# Patient Record
Sex: FEMALE | Race: ASIAN | NOT HISPANIC OR LATINO
[De-identification: names, ages, dates, MRNs, and addresses within clinical notes are randomized per-mention and may not be internally consistent; named-entity substitution may affect disease eponyms.]

---

## 2018-02-06 PROBLEM — Z00.00 ENCOUNTER FOR PREVENTIVE HEALTH EXAMINATION: Status: ACTIVE | Noted: 2018-02-06

## 2020-07-21 ENCOUNTER — TRANSCRIPTION ENCOUNTER (OUTPATIENT)
Age: 40
End: 2020-07-21

## 2020-07-28 ENCOUNTER — TRANSCRIPTION ENCOUNTER (OUTPATIENT)
Age: 40
End: 2020-07-28

## 2021-05-08 ENCOUNTER — EMERGENCY (EMERGENCY)
Facility: HOSPITAL | Age: 41
LOS: 0 days | Discharge: HOME | End: 2021-05-08
Attending: EMERGENCY MEDICINE
Payer: COMMERCIAL

## 2021-05-08 VITALS
RESPIRATION RATE: 16 BRPM | OXYGEN SATURATION: 98 % | HEART RATE: 86 BPM | SYSTOLIC BLOOD PRESSURE: 114 MMHG | WEIGHT: 210.1 LBS | DIASTOLIC BLOOD PRESSURE: 53 MMHG | TEMPERATURE: 99 F | HEIGHT: 62 IN

## 2021-05-08 VITALS
DIASTOLIC BLOOD PRESSURE: 85 MMHG | OXYGEN SATURATION: 99 % | TEMPERATURE: 98 F | RESPIRATION RATE: 17 BRPM | HEART RATE: 86 BPM | SYSTOLIC BLOOD PRESSURE: 134 MMHG

## 2021-05-08 DIAGNOSIS — R06.02 SHORTNESS OF BREATH: ICD-10-CM

## 2021-05-08 DIAGNOSIS — R07.9 CHEST PAIN, UNSPECIFIED: ICD-10-CM

## 2021-05-08 LAB
ALBUMIN SERPL ELPH-MCNC: 3.7 G/DL — SIGNIFICANT CHANGE UP (ref 3.3–5)
ALP SERPL-CCNC: 99 U/L — SIGNIFICANT CHANGE UP (ref 40–120)
ALT FLD-CCNC: 69 U/L — SIGNIFICANT CHANGE UP (ref 12–78)
ANION GAP SERPL CALC-SCNC: 5 MMOL/L — SIGNIFICANT CHANGE UP (ref 5–17)
AST SERPL-CCNC: 34 U/L — SIGNIFICANT CHANGE UP (ref 15–37)
BASOPHILS # BLD AUTO: 0.05 K/UL — SIGNIFICANT CHANGE UP (ref 0–0.2)
BASOPHILS NFR BLD AUTO: 0.7 % — SIGNIFICANT CHANGE UP (ref 0–2)
BILIRUB SERPL-MCNC: 0.6 MG/DL — SIGNIFICANT CHANGE UP (ref 0.2–1.2)
BUN SERPL-MCNC: 12 MG/DL — SIGNIFICANT CHANGE UP (ref 7–23)
CALCIUM SERPL-MCNC: 8.9 MG/DL — SIGNIFICANT CHANGE UP (ref 8.5–10.1)
CHLORIDE SERPL-SCNC: 104 MMOL/L — SIGNIFICANT CHANGE UP (ref 96–108)
CO2 SERPL-SCNC: 27 MMOL/L — SIGNIFICANT CHANGE UP (ref 22–31)
CREAT SERPL-MCNC: 0.94 MG/DL — SIGNIFICANT CHANGE UP (ref 0.5–1.3)
EOSINOPHIL # BLD AUTO: 0.19 K/UL — SIGNIFICANT CHANGE UP (ref 0–0.5)
EOSINOPHIL NFR BLD AUTO: 2.5 % — SIGNIFICANT CHANGE UP (ref 0–6)
GLUCOSE SERPL-MCNC: 94 MG/DL — SIGNIFICANT CHANGE UP (ref 70–99)
HCG SERPL-ACNC: <1 MIU/ML — SIGNIFICANT CHANGE UP
HCT VFR BLD CALC: 45.4 % — HIGH (ref 34.5–45)
HGB BLD-MCNC: 15.2 G/DL — SIGNIFICANT CHANGE UP (ref 11.5–15.5)
IMM GRANULOCYTES NFR BLD AUTO: 0.3 % — SIGNIFICANT CHANGE UP (ref 0–1.5)
LYMPHOCYTES # BLD AUTO: 2.88 K/UL — SIGNIFICANT CHANGE UP (ref 1–3.3)
LYMPHOCYTES # BLD AUTO: 38.2 % — SIGNIFICANT CHANGE UP (ref 13–44)
MCHC RBC-ENTMCNC: 29.3 PG — SIGNIFICANT CHANGE UP (ref 27–34)
MCHC RBC-ENTMCNC: 33.5 GM/DL — SIGNIFICANT CHANGE UP (ref 32–36)
MCV RBC AUTO: 87.6 FL — SIGNIFICANT CHANGE UP (ref 80–100)
MONOCYTES # BLD AUTO: 0.65 K/UL — SIGNIFICANT CHANGE UP (ref 0–0.9)
MONOCYTES NFR BLD AUTO: 8.6 % — SIGNIFICANT CHANGE UP (ref 2–14)
NEUTROPHILS # BLD AUTO: 3.74 K/UL — SIGNIFICANT CHANGE UP (ref 1.8–7.4)
NEUTROPHILS NFR BLD AUTO: 49.7 % — SIGNIFICANT CHANGE UP (ref 43–77)
NRBC # BLD: 0 /100 WBCS — SIGNIFICANT CHANGE UP (ref 0–0)
NT-PROBNP SERPL-SCNC: 17 PG/ML — SIGNIFICANT CHANGE UP (ref 0–125)
PLATELET # BLD AUTO: 266 K/UL — SIGNIFICANT CHANGE UP (ref 150–400)
POTASSIUM SERPL-MCNC: 4.1 MMOL/L — SIGNIFICANT CHANGE UP (ref 3.5–5.3)
POTASSIUM SERPL-SCNC: 4.1 MMOL/L — SIGNIFICANT CHANGE UP (ref 3.5–5.3)
PROT SERPL-MCNC: 7.5 GM/DL — SIGNIFICANT CHANGE UP (ref 6–8.3)
RBC # BLD: 5.18 M/UL — SIGNIFICANT CHANGE UP (ref 3.8–5.2)
RBC # FLD: 11.9 % — SIGNIFICANT CHANGE UP (ref 10.3–14.5)
SODIUM SERPL-SCNC: 136 MMOL/L — SIGNIFICANT CHANGE UP (ref 135–145)
TROPONIN I SERPL-MCNC: <.015 NG/ML — SIGNIFICANT CHANGE UP (ref 0.01–0.04)
WBC # BLD: 7.53 K/UL — SIGNIFICANT CHANGE UP (ref 3.8–10.5)
WBC # FLD AUTO: 7.53 K/UL — SIGNIFICANT CHANGE UP (ref 3.8–10.5)

## 2021-05-08 PROCEDURE — 99285 EMERGENCY DEPT VISIT HI MDM: CPT

## 2021-05-08 PROCEDURE — 71045 X-RAY EXAM CHEST 1 VIEW: CPT | Mod: 26

## 2021-05-08 PROCEDURE — 93010 ELECTROCARDIOGRAM REPORT: CPT

## 2021-05-08 RX ORDER — ALBUTEROL 90 UG/1
3 AEROSOL, METERED ORAL
Qty: 0 | Refills: 0 | DISCHARGE

## 2021-05-08 NOTE — ED PROVIDER NOTE - PMH
TBD; PT will follow No pertinent past medical history <<----- Click to add NO pertinent Past Medical History

## 2021-05-08 NOTE — ED PROVIDER NOTE - CLINICAL SUMMARY MEDICAL DECISION MAKING FREE TEXT BOX
DDx: R/o ACS although unlike no pleuritic pain to suggest PE  Plan: CBC, CMP, troponin, D-dimer, chest X-ray, EKG, reassess DDx: R/o ACS although unlikey, no pleuritic pain to suggest PE  Plan: CBC, CMP, troponin, chest X-ray, EKG, reassess

## 2021-05-08 NOTE — ED PROVIDER NOTE - OBJECTIVE STATEMENT
Pt is a 40 year old female w/PMH of asthma who presents to the ED today for CP and SOB that started Thursday. Describes CP as tight and rates it a 2/10. Pain worsened today. Denies taking OCP. Pt has family hx of cardiac diseases. No hx of DVT/PE, No pleuritic pain, no CP currently.

## 2021-05-08 NOTE — ED ADULT NURSE NOTE - OBJECTIVE STATEMENT
39 Y/O FEMALE with PMH of Asthma. Presents to the ED with c/c of SOB, & middle chest pain from Thursday and has gotten worst today. pt LMP was 3/14/2021 and has not had a period since. pt denies any N/V/D/C fever or chills.

## 2021-05-08 NOTE — ED ADULT NURSE NOTE - NSSUHOSCREENINGYN_ED_ALL_ED
Please send out result letter with the following note, thanks.    Dom,    Your diabetes was test is elevated and shows that you have diabetes.  It is slightly elevated, not to the point where we need to consider medication.  We can discuss in more detail at our follow up visit.    -Teetee Hammer, CNP
Yes - the patient is able to be screened

## 2021-05-08 NOTE — ED PROVIDER NOTE - CARE PROVIDER_API CALL
Pablo Love  CARDIOLOGY  [address] [facility][1], [address] [place] [address]  Phone: [phone]  Fax: [phone]  Follow Up Time: 1-3 Days

## 2021-05-08 NOTE — ED PROVIDER NOTE - PROGRESS NOTE DETAILS
Pt is feeling better, labs wnl, ecg wnl, and cxr negative on my read. Pt is reassured, asymptomatic, and referred to cardiology for f/u. Pt is feeling well and is ready for d/c.

## 2021-05-08 NOTE — ED PROVIDER NOTE - PATIENT PORTAL LINK FT
You can access the FollowMyHealth Patient Portal offered by [facility] by registering at the following website: [email or web address]. By joining [facility]’s FollowMyHealth portal, you will also be able to view your health information using other applications (apps) compatible with our system.

## 2022-05-23 PROBLEM — Z78.9 OTHER SPECIFIED HEALTH STATUS: Chronic | Status: ACTIVE | Noted: 2021-05-08

## 2022-07-10 ENCOUNTER — NON-APPOINTMENT (OUTPATIENT)
Age: 42
End: 2022-07-10

## 2022-07-13 ENCOUNTER — NON-APPOINTMENT (OUTPATIENT)
Age: 42
End: 2022-07-13

## 2022-07-14 ENCOUNTER — APPOINTMENT (OUTPATIENT)
Dept: HUMAN REPRODUCTION | Facility: CLINIC | Age: 42
End: 2022-07-14

## 2022-07-14 PROCEDURE — 36415 COLL VENOUS BLD VENIPUNCTURE: CPT

## 2022-07-14 PROCEDURE — 76830 TRANSVAGINAL US NON-OB: CPT

## 2022-07-14 PROCEDURE — 99205 OFFICE O/P NEW HI 60 MIN: CPT | Mod: 25

## 2022-08-03 ENCOUNTER — APPOINTMENT (OUTPATIENT)
Dept: OBGYN | Facility: CLINIC | Age: 42
End: 2022-08-03

## 2022-08-03 VITALS
HEIGHT: 62 IN | DIASTOLIC BLOOD PRESSURE: 76 MMHG | OXYGEN SATURATION: 98 % | WEIGHT: 215 LBS | SYSTOLIC BLOOD PRESSURE: 118 MMHG | HEART RATE: 70 BPM | BODY MASS INDEX: 39.56 KG/M2

## 2022-08-03 DIAGNOSIS — N83.209 UNSPECIFIED OVARIAN CYST, UNSPECIFIED SIDE: ICD-10-CM

## 2022-08-03 DIAGNOSIS — D25.9 LEIOMYOMA OF UTERUS, UNSPECIFIED: ICD-10-CM

## 2022-08-03 PROCEDURE — 99203 OFFICE O/P NEW LOW 30 MIN: CPT

## 2022-08-03 NOTE — PHYSICAL EXAM
[Appropriately responsive] : appropriately responsive [Alert] : alert [No Acute Distress] : no acute distress [No Lymphadenopathy] : no lymphadenopathy [Soft] : soft [Non-tender] : non-tender [Non-distended] : non-distended [No HSM] : No HSM [No Lesions] : no lesions [No Mass] : no mass [Oriented x3] : oriented x3 [FreeTextEntry7] : abd soft nt, nd

## 2022-08-03 NOTE — END OF VISIT
[FreeTextEntry3] : I, Jyoti Cain, acted as a scribe on behalf of Dr. Natalia Villareal on 08/03/2022 .\par \par All medical entries made by the scribe were at my, Dr. Natalia Villareal, direction and personally dictated by me on 08/03/2022 . I have reviewed the chart and agree that the record accurately reflects my personal performance of the history, physical exam, assessment and plan. I have also personally directed, reviewed, and agreed with the chart.\par

## 2022-08-03 NOTE — HISTORY OF PRESENT ILLNESS
[Patient reported PAP Smear was normal] : Patient reported PAP Smear was normal [FreeTextEntry1] : 41 y/o LMP 6/25/22 female for consultation regarding polyp and fibroids. Pt referred by Dr. Erica William. Pt states past MRI shows 7 fibroids and 1 polyp. Pt states wanting to conceive, has partner. Pt hx of TOP in 2001.  Pt reports regular menses every 28 days, lasting 5-7 days. Heavy menses for first 2 days, no pain. Pt recently noticed light spotting 3-4 days of spotting after menses. Has been happening for 2 years. Pt reports leakage of urine starting recently last month. \par \par GynHx: fibroids, polyp, chlamydia (2005)\par ObHx: Top (2001) \par PmHX: asthma\par PsHx: lipoma in right breast removed (2/2011)\par FamHx: DM (mother), Hypertension (mother, father), hyperlipemia (father) , pancreatic cancer ([facility]), heart attack (PGF), fibroids (mother) \par Current meds :denies, Pt d/c bc in 2011\par allergies: shrimp, sesame seed, animal dander, reaction to anesthesia \par NKDA [Mammogramdate] : 12/23/21 [PapSmeardate] : 10/29/21

## 2022-08-03 NOTE — PLAN
[FreeTextEntry1] : 43 y/o LMP 22 female for consultation regarding polyp and fibroids. \par \par Polyp?/ Ovarian Cyst/fibroids\par -rx pelvic sono to f/u  ovarian cyst on MRI\par - Dr. Villareal will review and f/u\par \par Pt brought MRI Disk to be reviewed\par -Dr. Villareal will upload to hospital and review with patient \par \par Fertility and preconception: \par -briefly discussed >41yo and potential impact on fertility and mentioned ART\par - Discussed the option of continued conservative observation of fibroids vs surgical removal. The risks of surgery including possible infertility and adhesion formation vs the risks of conservative followup: infertility, miscarriage, and PTL were discussed at length.  The possible need for primary  after myomectomy was also outlined.\par \par

## 2022-08-18 ENCOUNTER — APPOINTMENT (OUTPATIENT)
Dept: ULTRASOUND IMAGING | Facility: CLINIC | Age: 42
End: 2022-08-18

## 2022-08-18 ENCOUNTER — OUTPATIENT (OUTPATIENT)
Dept: OUTPATIENT SERVICES | Facility: HOSPITAL | Age: 42
LOS: 1 days | End: 2022-08-18
Payer: COMMERCIAL

## 2022-08-18 DIAGNOSIS — D25.9 LEIOMYOMA OF UTERUS, UNSPECIFIED: ICD-10-CM

## 2022-08-18 DIAGNOSIS — N83.209 UNSPECIFIED OVARIAN CYST, UNSPECIFIED SIDE: ICD-10-CM

## 2022-08-18 PROCEDURE — 76856 US EXAM PELVIC COMPLETE: CPT | Mod: 26

## 2022-08-18 PROCEDURE — 76830 TRANSVAGINAL US NON-OB: CPT | Mod: 26

## 2022-08-18 PROCEDURE — 76830 TRANSVAGINAL US NON-OB: CPT

## 2022-08-18 PROCEDURE — 76856 US EXAM PELVIC COMPLETE: CPT

## 2022-10-28 NOTE — REVIEW OF SYSTEMS
Patient dressed sitting up at bedside awaiting ride.  
Patient's yakov Us called with ETA 14:35. She will call when she arrives.  
[Negative] : Heme/Lymph

## 2022-11-28 ENCOUNTER — EMERGENCY (EMERGENCY)
Facility: HOSPITAL | Age: 42
LOS: 1 days | Discharge: HOME | End: 2022-11-28
Attending: STUDENT IN AN ORGANIZED HEALTH CARE EDUCATION/TRAINING PROGRAM | Admitting: STUDENT IN AN ORGANIZED HEALTH CARE EDUCATION/TRAINING PROGRAM

## 2022-11-28 VITALS
DIASTOLIC BLOOD PRESSURE: 89 MMHG | OXYGEN SATURATION: 99 % | RESPIRATION RATE: 16 BRPM | TEMPERATURE: 99 F | HEART RATE: 97 BPM | SYSTOLIC BLOOD PRESSURE: 146 MMHG

## 2022-11-28 VITALS
SYSTOLIC BLOOD PRESSURE: 152 MMHG | TEMPERATURE: 100 F | DIASTOLIC BLOOD PRESSURE: 91 MMHG | OXYGEN SATURATION: 99 % | RESPIRATION RATE: 17 BRPM | HEART RATE: 99 BPM

## 2022-11-28 LAB
ALBUMIN SERPL ELPH-MCNC: 3.7 G/DL — SIGNIFICANT CHANGE UP (ref 3.3–5)
ALP SERPL-CCNC: 73 U/L — SIGNIFICANT CHANGE UP (ref 40–120)
ALT FLD-CCNC: 34 U/L — HIGH (ref 4–33)
ANION GAP SERPL CALC-SCNC: 12 MMOL/L — SIGNIFICANT CHANGE UP (ref 7–14)
AST SERPL-CCNC: 23 U/L — SIGNIFICANT CHANGE UP (ref 4–32)
BASOPHILS # BLD AUTO: 0.04 K/UL — SIGNIFICANT CHANGE UP (ref 0–0.2)
BASOPHILS NFR BLD AUTO: 0.5 % — SIGNIFICANT CHANGE UP (ref 0–2)
BILIRUB SERPL-MCNC: 0.5 MG/DL — SIGNIFICANT CHANGE UP (ref 0.2–1.2)
BUN SERPL-MCNC: 9 MG/DL — SIGNIFICANT CHANGE UP (ref 7–23)
CALCIUM SERPL-MCNC: 8 MG/DL — LOW (ref 8.4–10.5)
CHLORIDE SERPL-SCNC: 91 MMOL/L — LOW (ref 98–107)
CO2 SERPL-SCNC: 19 MMOL/L — LOW (ref 22–31)
CREAT SERPL-MCNC: 0.79 MG/DL — SIGNIFICANT CHANGE UP (ref 0.5–1.3)
EGFR: 96 ML/MIN/1.73M2 — SIGNIFICANT CHANGE UP
EOSINOPHIL # BLD AUTO: 0.04 K/UL — SIGNIFICANT CHANGE UP (ref 0–0.5)
EOSINOPHIL NFR BLD AUTO: 0.5 % — SIGNIFICANT CHANGE UP (ref 0–6)
GLUCOSE SERPL-MCNC: 92 MG/DL — SIGNIFICANT CHANGE UP (ref 70–99)
HCG SERPL-ACNC: <5 MIU/ML — SIGNIFICANT CHANGE UP
HCT VFR BLD CALC: 43.2 % — SIGNIFICANT CHANGE UP (ref 34.5–45)
HGB BLD-MCNC: 14 G/DL — SIGNIFICANT CHANGE UP (ref 11.5–15.5)
IANC: 6 K/UL — SIGNIFICANT CHANGE UP (ref 1.8–7.4)
IMM GRANULOCYTES NFR BLD AUTO: 0.6 % — SIGNIFICANT CHANGE UP (ref 0–0.9)
LYMPHOCYTES # BLD AUTO: 1.76 K/UL — SIGNIFICANT CHANGE UP (ref 1–3.3)
LYMPHOCYTES # BLD AUTO: 19.9 % — SIGNIFICANT CHANGE UP (ref 13–44)
MCHC RBC-ENTMCNC: 28.7 PG — SIGNIFICANT CHANGE UP (ref 27–34)
MCHC RBC-ENTMCNC: 32.4 GM/DL — SIGNIFICANT CHANGE UP (ref 32–36)
MCV RBC AUTO: 88.7 FL — SIGNIFICANT CHANGE UP (ref 80–100)
MONOCYTES # BLD AUTO: 0.95 K/UL — HIGH (ref 0–0.9)
MONOCYTES NFR BLD AUTO: 10.7 % — SIGNIFICANT CHANGE UP (ref 2–14)
NEUTROPHILS # BLD AUTO: 6 K/UL — SIGNIFICANT CHANGE UP (ref 1.8–7.4)
NEUTROPHILS NFR BLD AUTO: 67.8 % — SIGNIFICANT CHANGE UP (ref 43–77)
NRBC # BLD: 0 /100 WBCS — SIGNIFICANT CHANGE UP (ref 0–0)
NRBC # FLD: 0 K/UL — SIGNIFICANT CHANGE UP (ref 0–0)
PLATELET # BLD AUTO: 173 K/UL — SIGNIFICANT CHANGE UP (ref 150–400)
POTASSIUM SERPL-MCNC: 3.6 MMOL/L — SIGNIFICANT CHANGE UP (ref 3.5–5.3)
POTASSIUM SERPL-SCNC: 3.6 MMOL/L — SIGNIFICANT CHANGE UP (ref 3.5–5.3)
PROT SERPL-MCNC: 6.9 G/DL — SIGNIFICANT CHANGE UP (ref 6–8.3)
RBC # BLD: 4.87 M/UL — SIGNIFICANT CHANGE UP (ref 3.8–5.2)
RBC # FLD: 12.7 % — SIGNIFICANT CHANGE UP (ref 10.3–14.5)
SODIUM SERPL-SCNC: 122 MMOL/L — LOW (ref 135–145)
WBC # BLD: 8.84 K/UL — SIGNIFICANT CHANGE UP (ref 3.8–10.5)
WBC # FLD AUTO: 8.84 K/UL — SIGNIFICANT CHANGE UP (ref 3.8–10.5)

## 2022-11-28 PROCEDURE — 99285 EMERGENCY DEPT VISIT HI MDM: CPT

## 2022-11-28 PROCEDURE — 70487 CT MAXILLOFACIAL W/DYE: CPT | Mod: 26,MA

## 2022-11-28 RX ORDER — KETOROLAC TROMETHAMINE 30 MG/ML
15 SYRINGE (ML) INJECTION ONCE
Refills: 0 | Status: COMPLETED | OUTPATIENT
Start: 2022-11-28 | End: 2022-11-28

## 2022-11-28 RX ORDER — ACETAMINOPHEN 500 MG
650 TABLET ORAL ONCE
Refills: 0 | Status: COMPLETED | OUTPATIENT
Start: 2022-11-28 | End: 2022-11-28

## 2022-11-28 RX ADMIN — Medication 650 MILLIGRAM(S): at 16:35

## 2022-11-28 NOTE — ED PROVIDER NOTE - CLINICAL SUMMARY MEDICAL DECISION MAKING FREE TEXT BOX
Will obtain basic labs, will also obtain CT imaging with IV contrast of the max face to rule out submandibular abscess, rule out sialoadenitis, rule out Kalia's angina.  Will reassess patient after pain control with Toradol.

## 2022-11-28 NOTE — ED PROVIDER NOTE - OBJECTIVE STATEMENT
Patient is a 42-year-old female no past medical history who is presenting to the emergency department with almost a week of right-sided facial pain.  Worse when she chews.  Has had no fevers.  Says it started underneath her right eye but now feels like its underneath her right lower jaw.  Went to a dentist today who said there was nothing wrong with with her teeth.  So she went to the emergency department for second opinion.  Able to tolerate p.o.  No fevers or systemic signs of infection

## 2022-11-28 NOTE — ED ADULT TRIAGE NOTE - CHIEF COMPLAINT QUOTE
Pt arrives ambulatory to triage c/o "facial swelling" since Wednesday. Seen at urgent care, dx'd with sinus infection. Pt is concerned d/t difficulty eating and swelling spreading underneath her chin. Endorses SOB at night. RR even and unlabored. PMH: asthma.

## 2022-11-28 NOTE — ED PROVIDER NOTE - PATIENT PORTAL LINK FT
You can access the FollowMyHealth Patient Portal offered by [facility] by registering at the following website: [email or web address]. By joining [facility]’s FollowMyHealth portal, you will also be able to view your health information using other applications (apps) compatible with our system.

## 2022-11-28 NOTE — ED PROVIDER NOTE - NSFOLLOWUPINSTRUCTIONS_ED_ALL_ED_FT
take acetaminophen 650 mg every 6 hours and ibuprofen 400 mg every 6 hours with food. Both of these medications work differently to treat pain and inflammation. Please note that these medications are both available over the counter at most pharmacies without a doctor's prescription.      Sialoadenitis    WHAT YOU NEED TO KNOW:    Sialoadenitis is an inflammation or infection of one or more of your salivary glands. A small stone can block the salivary gland and cause inflammation. Infection may be caused by a virus or bacteria. You can develop sialoadenitis on one or both sides of your face.    DISCHARGE INSTRUCTIONS:    Return to the emergency department if:    You have trouble breathing or swallowing because of swelling.    Call your doctor if:    You have trouble opening your mouth because of swelling.    Your salivary gland gets more red and hot or drains more pus.    The pain and swelling do not go away within 2 days, or they get worse.    Your mouth is very dry.    You lose movement on one side of your face.    You have questions about your condition or care.  Medicines: You may need one or more of the following:    Antibiotics may be given to treat a bacterial infection.    Acetaminophen decreases pain and fever. It is available without a doctor's order. Ask how much to give your child and how often to give it. Follow directions. Read the labels of all other medicines your child uses to see if they also contain acetaminophen, or ask your child's doctor or pharmacist. Acetaminophen can cause liver damage if not taken correctly.    NSAIDs, such as ibuprofen, help decrease swelling, pain, and fever. This medicine is available with or without a doctor's order. NSAIDs can cause stomach bleeding or kidney problems in certain people. If you take blood thinner medicine, always ask your healthcare provider if NSAIDs are safe for you. Always read the medicine label and follow directions.    Take your medicine as directed. Contact your healthcare provider if you think your medicine is not helping or if you have side effects. Tell your provider if you are allergic to any medicine. Keep a list of the medicines, vitamins, and herbs you take. Include the amounts, and when and why you take them. Bring the list or the pill bottles to follow-up visits. Carry your medicine list with you in case of an emergency.  Manage or prevent sialoadenitis:    Drink liquids as directed. You may need to drink more liquids than usual. Ask how much liquid to drink each day and which liquids are best for you. Good choices of liquids for most people include water, tea, soup, juice, or milk.    Practice good oral care. Brush your teeth 2 times a day, 1 time in the morning and 1 time in the evening. Use a fluoride toothpaste. Floss your teeth 1 time each day, usually in the evening. Use mouthwash after you floss. Swish it around in your mouth for 30 seconds and spit it out.    Keep your mouth moist. Suck on hard candy or chew sugarless gum to get your saliva flowing. Sour and tart flavors such as lemon and orange will help get saliva to flow. This will help keep your mouth moist and help push out a stone blocking your salivary duct.    Apply a warm, wet cloth and massage the swollen area as directed. This may help relieve swelling and pain by pushing the pus out of the gland.  Follow up with your doctor or dentist as directed: Write down your questions so you remember to ask them during your visits.

## 2022-11-28 NOTE — ED PROVIDER NOTE - WET READ LAUNCH FT
There are no Wet Read(s) to document. No casts/No immobilization/No edema/Full range of motion with no contractures/No clubbing/No erythema/No splints/No cyanosis/No tenderness

## 2022-11-28 NOTE — ED ADULT NURSE NOTE - OBJECTIVE STATEMENT
Patient is a&ox4 ambulatory at baseline. Patient complaining of facial swelling. Patient denies chest pain sob, patient able to swallow PO meds. No nuerological defecits.

## 2022-11-28 NOTE — ED PROVIDER NOTE - PHYSICAL EXAMINATION
CONSTITUTIONAL: Non-toxic, non-diaphoretic, in no apparent distress  HEAD: Normocephalic; atraumatic  EYES: EOM intact   ENMT: External appears normal;  Oropharynx with multiple filled caries, no acute infection appreciated, no induration no asymmetry of the internal oropharynx.  However patient does have some tenderness to palpation of the right submandibular space, no overlying cellulitis.  No induration.  NECK: grossly normal active ROM,  CARD: No cyanosis, good peripheral perfusion, RRR  RESP: Normal chest excursion with respiration; no increased work of breathing  ABD: non-distended   EXT: moving all extremities, no gross disfigurement or asymmetry,  SKIN: Warm, dry, no rash  NEURO:  moving all extremities, no facial droop, no dysarthria      cn2-12 intact  5/5 all extremities

## 2023-03-28 ENCOUNTER — APPOINTMENT (OUTPATIENT)
Dept: CARDIOLOGY | Facility: CLINIC | Age: 43
End: 2023-03-28

## 2023-04-27 NOTE — ED ADULT TRIAGE NOTE - NSTRIAGECARE_GEN_A_ER
Please notify patient urinalysis shows blood minimal, RBC 5-10, this could be kidney stones or could be cystitis [inflammation of the bladder] ; waiting for urine culture results doubt urine infection.  I do recommend patient sees urology for further work-up; may need even a cystoscopy for further evaluation.  I did place referral to urology already.  Dr Velasco    *Please see other result notes Face Mask

## 2023-06-14 ENCOUNTER — APPOINTMENT (OUTPATIENT)
Dept: OBGYN | Facility: CLINIC | Age: 43
End: 2023-06-14
Payer: COMMERCIAL

## 2023-06-14 PROCEDURE — 99213 OFFICE O/P EST LOW 20 MIN: CPT | Mod: 95

## 2023-11-08 ENCOUNTER — TRANSCRIPTION ENCOUNTER (OUTPATIENT)
Age: 43
End: 2023-11-08

## 2024-05-06 ENCOUNTER — NON-APPOINTMENT (OUTPATIENT)
Age: 44
End: 2024-05-06

## 2024-05-06 ENCOUNTER — APPOINTMENT (OUTPATIENT)
Dept: ORTHOPEDIC SURGERY | Facility: CLINIC | Age: 44
End: 2024-05-06
Payer: COMMERCIAL

## 2024-05-06 VITALS — HEIGHT: 62 IN | BODY MASS INDEX: 42.33 KG/M2 | WEIGHT: 230 LBS

## 2024-05-06 DIAGNOSIS — M23.91 UNSPECIFIED INTERNAL DERANGEMENT OF RIGHT KNEE: ICD-10-CM

## 2024-05-06 DIAGNOSIS — M79.18 MYALGIA, OTHER SITE: ICD-10-CM

## 2024-05-06 DIAGNOSIS — E78.00 PURE HYPERCHOLESTEROLEMIA, UNSPECIFIED: ICD-10-CM

## 2024-05-06 DIAGNOSIS — M23.92 UNSPECIFIED INTERNAL DERANGEMENT OF LEFT KNEE: ICD-10-CM

## 2024-05-06 DIAGNOSIS — I10 ESSENTIAL (PRIMARY) HYPERTENSION: ICD-10-CM

## 2024-05-06 PROCEDURE — 73564 X-RAY EXAM KNEE 4 OR MORE: CPT | Mod: RT

## 2024-05-06 PROCEDURE — 99204 OFFICE O/P NEW MOD 45 MIN: CPT | Mod: 25

## 2024-05-06 RX ORDER — NAPROXEN 500 MG/1
500 TABLET ORAL TWICE DAILY
Qty: 60 | Refills: 0 | Status: ACTIVE | COMMUNITY
Start: 2024-05-06 | End: 1900-01-01

## 2024-05-06 NOTE — HISTORY OF PRESENT ILLNESS
[de-identified] : 43 year old female  (  )  right knee pain since 5/4/24 when twisted in an escape  room while lifting up leg and twisted and knee gave out and heard a pop  The pain is located   anterior, lateral  The pain is associated with swelling, clicking, buckling  Worse with activity and better at rest. Has tried ice, ibuprofen

## 2024-05-06 NOTE — IMAGING
[de-identified] :  RIGHT KNEE Inspection:  mild effusion Palpation: lateral joint line tenderness  Knee Range of Motion:  0-130  Strength: 5/5 Quadriceps strength, 5/5 Hamstring strength Neurological: light touch is intact throughout Ligament Stability and Special Tests:  McMurrays: Positive Lachman: neg Pivot Shift: neg Posterior Drawer: neg Valgus: neg Varus: neg Patella Apprehension: neg Patella Maltracking: neg

## 2024-05-06 NOTE — ASSESSMENT
[FreeTextEntry1] : Right  X-Ray Examination of the KNEE (4 views): there are no fractures, subluxations or dislocations.  Due to the patients mechanical symptoms along with lateral joint line pain, effusion, and pos aliya test on exam we will get an mri to eval for lateral meniscus tear  - The patient was advised of the diagnosis.  The natural history of the pathology was explained to the patient in layman's terms.  Several different treatment options were discussed and explained including the risks and benefits of both surgical and non-surgical treatments. - The patient was advised to apply ice (wrapped in a towel or protective covering) to the area daily (20 minutes at a time, 2-4X/day). - naprosyn rx - Patient was given a prescription for an anti-inflammatory medication.  They will take it for the next week and then on an as needed basis, as long as there are no medical contra-indications.  Patient is counseled on possible GI, renal, and cardiovascular side effects. - Hinged Knee brace in order to minimize buckling and instability - The patient was advised to modify their activities. - f/u after mri

## 2024-05-09 ENCOUNTER — APPOINTMENT (OUTPATIENT)
Dept: MRI IMAGING | Facility: CLINIC | Age: 44
End: 2024-05-09
Payer: COMMERCIAL

## 2024-05-09 ENCOUNTER — APPOINTMENT (OUTPATIENT)
Dept: MRI IMAGING | Facility: CLINIC | Age: 44
End: 2024-05-09

## 2024-05-09 PROCEDURE — 73721 MRI JNT OF LWR EXTRE W/O DYE: CPT | Mod: RT

## 2024-05-22 PROBLEM — S83.511A RUPTURE OF ANTERIOR CRUCIATE LIGAMENT OF RIGHT KNEE, INITIAL ENCOUNTER: Status: ACTIVE | Noted: 2024-05-22

## 2024-05-22 PROBLEM — S83.271A COMPLEX TEAR OF LATERAL MENISCUS OF RIGHT KNEE AS CURRENT INJURY, INITIAL ENCOUNTER: Status: ACTIVE | Noted: 2024-05-22

## 2024-05-23 ENCOUNTER — APPOINTMENT (OUTPATIENT)
Dept: ORTHOPEDIC SURGERY | Facility: CLINIC | Age: 44
End: 2024-05-23
Payer: COMMERCIAL

## 2024-05-23 DIAGNOSIS — S83.511A SPRAIN OF ANTERIOR CRUCIATE LIGAMENT OF RIGHT KNEE, INITIAL ENCOUNTER: ICD-10-CM

## 2024-05-23 DIAGNOSIS — S83.271A COMPLEX TEAR OF LATERAL MENISCUS, CURRENT INJURY, RIGHT KNEE, INITIAL ENCOUNTER: ICD-10-CM

## 2024-05-23 PROCEDURE — 99214 OFFICE O/P EST MOD 30 MIN: CPT

## 2024-05-23 NOTE — DISCUSSION/SUMMARY
[de-identified] : The patient was advised of the diagnosis. The natural history of the pathology was explained in full to the patient in layman's terms. Several different treatment options were discussed and explained to the patient including the risks and benefits of both surgical and non-surgical treatments. The patient demonstrated a full understanding of the surgical and non-surgical options.  The risks, benefits, and alternatives to surgical arthroscopy of the right knee with allograft ACL reconstruction, possible partial meniscectomy vs. meniscus repair were reviewed with the patient. This discussion included the risks, benefits and alternatives to various graft choices including central third autograft patellar tendon, autograft hamstring, autograft quadriceps, and allograft tissue were discussed. Questions regarding each were answered in layman's terms. The patient chose an allograft. Included in the discussion of allograft was risk of disease transmission and re-rupture rate. Patient is referred to www.mtf.org for further information about allografts.  Specifically, I also reviewed with the patient that any anterior knee pain may paradoxically worsen for the first 6 weeks following arthroscopy due to quadriceps weakness. Further, I reviewed with the patient that while arthroscopic treatments typically provide substantial relief of symptoms (instability and mechanical symptoms) related to ACL tears and meniscal tears, arthroscopic treatments typically have very minimal relief of symptoms (anterior knee pain) related to chondromalacia or early osteoarthritis. The patient clearly communicated that these issues were understood.  Other risks of surgery were outlined in full to the patient including but not limited to pain, infection (superficial or deep), bleeding, vascular injury, numbness, tingling, nerve damage (direct or indirect), scarring, non-healing, wound breakdown, failure to resolve symptoms, symptom recurrence, the need for further surgery, as well as medical complications such as blood clots, pulmonary embolism, heart attack, stroke, and other anesthesia complications including even death. More specifically, we also discussed that there could be post-op anterior knee pain, injury to the infrapatellar branch of the saphenous nerve causing permanent numbness near the incision, re-rupture of the ACL, motion loss and/or arthrofibrosis, and persistent pain even after surgery. The patient understood and accepted these risks and that other complications not mentioned above could occur.  The intraoperative plan, post-operative plan, post-operative expectations and limitations were explained in full. The importance of postoperative rehabilitation and restriction compliance was emphasized. Expectations from non-surgical treatment were explained in full as well. The patient demonstrated a complete understanding of the treatment alternatives and requested to proceed with surgery. This will be scheduled accordingly.

## 2024-05-23 NOTE — IMAGING
[de-identified] : RIGHT KNEE Inspection:  mild effusion Palpation: lateral joint line tenderness  Knee Range of Motion:  0-130  Strength: 5/5 Quadriceps strength, 5/5 Hamstring strength Neurological: light touch is intact throughout Ligament Stability and Special Tests:  McMurrays: Positive Lachman: pos Pivot Shift: pos Posterior Drawer: neg Valgus: neg Varus: neg Patella Apprehension: neg Patella Maltracking: neg

## 2024-05-23 NOTE — HISTORY OF PRESENT ILLNESS
[de-identified] : 43 year old female  (, active  )  right knee pain since 5/4/24 when twisted in an escape  room while lifting up leg and twisted and knee gave out and heard a pop  The pain is located   anterior, lateral  The pain is associated with swelling, clicking, buckling  Worse with activity and better at rest. Has tried ice, ibuprofen   5/22/24 - had mri showing acl tear and LMT, cont to have pain along with buckling and sense of instability, using brsce and mod activity

## 2024-05-23 NOTE — ASSESSMENT
[FreeTextEntry1] : mri right knee 5/9/24 - acl tear with BB, LMT (AH/body), eff, synov, st swelling  - We discussed their diagnosis and treatment options at length including the risks and benefits of both surgical and non-surgical options. - Given their active lifestyle along with instability they are a surg candidate - The risks, benefits, and alternatives to right knee ACLR (allo), PLM vs LMR were discussed with the patient, all questions were answered.

## 2024-05-31 ENCOUNTER — NON-APPOINTMENT (OUTPATIENT)
Age: 44
End: 2024-05-31

## 2024-08-21 ENCOUNTER — APPOINTMENT (OUTPATIENT)
Dept: ORTHOPEDIC SURGERY | Facility: AMBULATORY SURGERY CENTER | Age: 44
End: 2024-08-21

## 2024-08-26 ENCOUNTER — APPOINTMENT (OUTPATIENT)
Dept: ORTHOPEDIC SURGERY | Facility: CLINIC | Age: 44
End: 2024-08-26

## 2024-09-05 ENCOUNTER — RESULT REVIEW (OUTPATIENT)
Age: 44
End: 2024-09-05

## 2024-09-05 ENCOUNTER — OUTPATIENT (OUTPATIENT)
Dept: OUTPATIENT SERVICES | Facility: HOSPITAL | Age: 44
LOS: 1 days | End: 2024-09-05
Payer: COMMERCIAL

## 2024-09-05 ENCOUNTER — APPOINTMENT (OUTPATIENT)
Dept: CV DIAGNOSTICS | Facility: HOSPITAL | Age: 44
End: 2024-09-05

## 2024-09-05 DIAGNOSIS — R07.9 CHEST PAIN, UNSPECIFIED: ICD-10-CM

## 2024-09-05 LAB — HCG UR QL: NEGATIVE — SIGNIFICANT CHANGE UP

## 2024-09-05 PROCEDURE — 93016 CV STRESS TEST SUPVJ ONLY: CPT | Mod: GC,MC

## 2024-09-05 PROCEDURE — 93010 ELECTROCARDIOGRAM REPORT: CPT

## 2024-09-05 PROCEDURE — 93018 CV STRESS TEST I&R ONLY: CPT | Mod: GC,MC

## 2024-09-05 PROCEDURE — 78451 HT MUSCLE IMAGE SPECT SING: CPT | Mod: 26,MC
